# Patient Record
Sex: MALE | Race: WHITE | URBAN - METROPOLITAN AREA
[De-identification: names, ages, dates, MRNs, and addresses within clinical notes are randomized per-mention and may not be internally consistent; named-entity substitution may affect disease eponyms.]

---

## 2017-01-25 ENCOUNTER — OFFICE VISIT (OUTPATIENT)
Dept: FAMILY MEDICINE | Facility: CLINIC | Age: 52
End: 2017-01-25
Payer: COMMERCIAL

## 2017-01-25 ENCOUNTER — RADIANT APPOINTMENT (OUTPATIENT)
Dept: GENERAL RADIOLOGY | Facility: CLINIC | Age: 52
End: 2017-01-25
Attending: NURSE PRACTITIONER
Payer: COMMERCIAL

## 2017-01-25 VITALS
SYSTOLIC BLOOD PRESSURE: 142 MMHG | WEIGHT: 147 LBS | HEART RATE: 69 BPM | BODY MASS INDEX: 25.1 KG/M2 | DIASTOLIC BLOOD PRESSURE: 86 MMHG | TEMPERATURE: 97.9 F | RESPIRATION RATE: 20 BRPM | OXYGEN SATURATION: 98 % | HEIGHT: 64 IN

## 2017-01-25 DIAGNOSIS — R10.31 ABDOMINAL PAIN, RIGHT LOWER QUADRANT: Primary | ICD-10-CM

## 2017-01-25 DIAGNOSIS — R10.31 ABDOMINAL PAIN, RIGHT LOWER QUADRANT: ICD-10-CM

## 2017-01-25 PROBLEM — Z13.6 CARDIOVASCULAR SCREENING; LDL GOAL LESS THAN 160: Status: ACTIVE | Noted: 2017-01-25

## 2017-01-25 PROCEDURE — 99213 OFFICE O/P EST LOW 20 MIN: CPT | Performed by: NURSE PRACTITIONER

## 2017-01-25 PROCEDURE — 74010 XR KUB: CPT | Mod: 52

## 2017-01-25 NOTE — PROGRESS NOTES
"  SUBJECTIVE:                                                    Good Meyers is a 61 year old male who presents to clinic today for the following health issues:    Abdominal Pain      Duration: x 10 days     Description (location/character/radiation): lower right side of abdominal        Associated flank pain: None    Intensity:  Moderate to severe    Accompanying signs and symptoms:        Fever/Chills: no        Gas/Bloating: no        Nausea/vomitting: no        Diarrhea: no        Dysuria or Hematuria: no     History (previous similar pain/trauma/previous testing): no    Precipitating or alleviating factors:       Pain worse with eating/BM/urination: no       Pain relieved by BM: yes, a little bit     Therapies tried and outcome: no    LMP:  not applicable     Lots of heavy lifting for work.  Feels pain when he is lifting and flexing abdominals.  Does not feel any bulge in abdomen or groin.      Problem list and histories reviewed & adjusted, as indicated.  Additional history: as documented    Patient Active Problem List   Diagnosis     CARDIOVASCULAR SCREENING; LDL GOAL LESS THAN 160     Abdominal pain, right lower quadrant     No past surgical history on file.    Social History   Substance Use Topics     Smoking status: Light Tobacco Smoker     Smokeless tobacco: Not on file     Alcohol Use: No     Family History   Problem Relation Age of Onset     Family History Negative Mother      Family History Negative Father           Family History Negative Paternal Grandfather               No current outpatient prescriptions on file.     No Known Allergies    ROS:  Constitutional, HEENT, cardiovascular, pulmonary, gi and gu systems are negative, except as otherwise noted.    OBJECTIVE:                                                    /86 mmHg  Pulse 69  Temp(Src) 97.9  F (36.6  C) (Oral)  Resp 20  Ht 5' 3.75\" (1.619 m)  Wt 147 lb (66.679 kg)  BMI 25.44 kg/m2  SpO2 98%  Body mass " index is 25.44 kg/(m^2).  GENERAL: healthy, alert and no distress  RESP: lungs clear to auscultation - no rales, rhonchi or wheezes  CV: regular rate and rhythm, normal S1 S2, no S3 or S4, no murmur, click or rub, no peripheral edema and peripheral pulses strong  ABDOMEN: tenderness RLQ and bowel sounds normal  MS: no gross musculoskeletal defects noted, no edema  SKIN: no suspicious lesions or rashes  NEURO: Normal strength and tone, mentation intact and speech normal  PSYCH: mentation appears normal, affect normal/bright    Diagnostic Test Results:  Xray interpreted as negative in the clinic.  Pending radiologist review.          ASSESSMENT/PLAN:                                                        ICD-10-CM    1. Abdominal pain, right lower quadrant R10.31 XR KUB     US Abdomen Limited     Xray interpreted as negative in the clinic.  Pending radiologist review.    Will get abd US to r/o hernia.    See Patient Instructions    OSWALDO Downey CNP  [facility]

## 2017-01-26 NOTE — NURSING NOTE
"Chief Complaint   Patient presents with     Abdominal Pain       Initial /86 mmHg  Pulse 69  Temp(Src) 97.9  F (36.6  C) (Oral)  Resp 20  Ht 5' 3.75\" (1.619 m)  Wt 147 lb (66.679 kg)  BMI 25.44 kg/m2  SpO2 98% Estimated body mass index is 25.44 kg/(m^2) as calculated from the following:    Height as of this encounter: 5' 3.75\" (1.619 m).    Weight as of this encounter: 147 lb (66.679 kg).  BP completed using cuff size: concepción Messer MA      "

## 2017-01-30 ENCOUNTER — HOSPITAL ENCOUNTER (OUTPATIENT)
Dept: ULTRASOUND IMAGING | Facility: CLINIC | Age: 52
Discharge: HOME | End: 2017-01-30
Attending: NURSE PRACTITIONER | Admitting: NURSE PRACTITIONER
Payer: COMMERCIAL

## 2017-01-30 DIAGNOSIS — R10.31 ABDOMINAL PAIN, RIGHT LOWER QUADRANT: ICD-10-CM

## 2017-01-30 PROCEDURE — 76705 ECHO EXAM OF ABDOMEN: CPT
